# Patient Record
Sex: MALE | Race: WHITE | URBAN - METROPOLITAN AREA
[De-identification: names, ages, dates, MRNs, and addresses within clinical notes are randomized per-mention and may not be internally consistent; named-entity substitution may affect disease eponyms.]

---

## 2024-10-02 ENCOUNTER — OFFICE VISIT (OUTPATIENT)
Age: 39
End: 2024-10-02

## 2024-10-02 VITALS
RESPIRATION RATE: 20 BRPM | WEIGHT: 277 LBS | DIASTOLIC BLOOD PRESSURE: 89 MMHG | TEMPERATURE: 98.6 F | SYSTOLIC BLOOD PRESSURE: 130 MMHG | HEIGHT: 74 IN | HEART RATE: 83 BPM | BODY MASS INDEX: 35.55 KG/M2 | OXYGEN SATURATION: 96 %

## 2024-10-02 DIAGNOSIS — J01.10 ACUTE FRONTAL SINUSITIS, RECURRENCE NOT SPECIFIED: ICD-10-CM

## 2024-10-02 DIAGNOSIS — R05.9 COUGH, UNSPECIFIED TYPE: Primary | ICD-10-CM

## 2024-10-02 DIAGNOSIS — J06.9 UPPER RESPIRATORY TRACT INFECTION, UNSPECIFIED TYPE: ICD-10-CM

## 2024-10-02 LAB
Lab: NORMAL
PERFORMING INSTRUMENT: NORMAL
QC PASS/FAIL: NORMAL
SARS-COV-2, POC: NORMAL

## 2024-10-02 RX ORDER — LEVOTHYROXINE SODIUM 150 UG/1
TABLET ORAL
COMMUNITY

## 2024-10-02 RX ORDER — DEXTROAMPHETAMINE SACCHARATE, AMPHETAMINE ASPARTATE MONOHYDRATE, DEXTROAMPHETAMINE SULFATE AND AMPHETAMINE SULFATE 5; 5; 5; 5 MG/1; MG/1; MG/1; MG/1
1 CAPSULE, EXTENDED RELEASE ORAL EVERY MORNING
COMMUNITY

## 2024-10-02 ASSESSMENT — ENCOUNTER SYMPTOMS
RHINORRHEA: 1
SORE THROAT: 1
COUGH: 1
SINUS PRESSURE: 1

## 2024-10-02 NOTE — PROGRESS NOTES
10/2/2024   Ney Hammond (: 1985) is a 39 y.o. male, New patient, here for evaluation of the following chief complaint(s):  Cough, Congestion, and Pharyngitis (Patient reports thought he just had a cold and now he is coughing up green and yellow mucus and has a sore throat. Denies nausea, vomiting or diarrhea. Would like a covid test today.)     ASSESSMENT/PLAN:  Below is the assessment and plan developed based on review of pertinent history, physical exam, labs, studies, and medications.  1. Cough, unspecified type  -     POCT COVID-19, Antigen  2. Acute frontal sinusitis, recurrence not specified  3. Upper respiratory tract infection, unspecified type     - Augmentin    Handout given with care instructions  2. OTC for symptom management. Increase fluid intake, ensure adequate nutritional intake.  3. Follow up with PCP as needed.  4. Go to ED with development of any acute symptoms.     Follow up:  Return if symptoms worsen or fail to improve.  Follow up immediately for any new, worsening or changes or if symptoms are not improving over the next 5-7 days.     SUBJECTIVE/OBJECTIVE:    Cough  Associated symptoms include ear pain, postnasal drip, rhinorrhea and a sore throat. Pertinent negatives include no fever.   Pharyngitis  Associated symptoms: cough, ear pain, fatigue, rhinorrhea and sore throat    Associated symptoms: no fever         Diagnoses and all orders for this visit:  Cough, unspecified type  -     POCT COVID-19, Antigen      Cough, Congestion, and Pharyngitis (Patient reports thought he just had a cold and now he is coughing up green and yellow mucus and has a sore throat. Denies nausea, vomiting or diarrhea. Would like a covid test today.)      Results for orders placed or performed in visit on 10/02/24   POCT COVID-19, Antigen   Result Value Ref Range    SARS-COV-2, POC Not-Detected Not Detected    Lot Number [id number]     QC Pass/Fail PASS     Performing Instrument BinaxNOW            Review of